# Patient Record
Sex: FEMALE | Race: WHITE | NOT HISPANIC OR LATINO
[De-identification: names, ages, dates, MRNs, and addresses within clinical notes are randomized per-mention and may not be internally consistent; named-entity substitution may affect disease eponyms.]

---

## 2022-03-21 ENCOUNTER — APPOINTMENT (OUTPATIENT)
Dept: ANTEPARTUM | Facility: CLINIC | Age: 32
End: 2022-03-21
Payer: COMMERCIAL

## 2022-03-21 ENCOUNTER — ASOB RESULT (OUTPATIENT)
Age: 32
End: 2022-03-21

## 2022-03-21 ENCOUNTER — TRANSCRIPTION ENCOUNTER (OUTPATIENT)
Age: 32
End: 2022-03-21

## 2022-03-21 PROCEDURE — 76801 OB US < 14 WKS SINGLE FETUS: CPT

## 2022-03-21 PROCEDURE — 76813 OB US NUCHAL MEAS 1 GEST: CPT

## 2022-04-20 ENCOUNTER — ASOB RESULT (OUTPATIENT)
Age: 32
End: 2022-04-20

## 2022-04-20 ENCOUNTER — APPOINTMENT (OUTPATIENT)
Dept: ANTEPARTUM | Facility: CLINIC | Age: 32
End: 2022-04-20
Payer: COMMERCIAL

## 2022-04-20 PROCEDURE — 76805 OB US >/= 14 WKS SNGL FETUS: CPT

## 2022-04-20 PROCEDURE — 76817 TRANSVAGINAL US OBSTETRIC: CPT

## 2022-04-25 PROBLEM — Z00.00 ENCOUNTER FOR PREVENTIVE HEALTH EXAMINATION: Status: ACTIVE | Noted: 2022-04-25

## 2022-05-16 ENCOUNTER — ASOB RESULT (OUTPATIENT)
Age: 32
End: 2022-05-16

## 2022-05-16 ENCOUNTER — APPOINTMENT (OUTPATIENT)
Dept: ANTEPARTUM | Facility: CLINIC | Age: 32
End: 2022-05-16
Payer: COMMERCIAL

## 2022-05-16 PROCEDURE — 76816 OB US FOLLOW-UP PER FETUS: CPT

## 2022-07-11 ENCOUNTER — ASOB RESULT (OUTPATIENT)
Age: 32
End: 2022-07-11

## 2022-07-11 ENCOUNTER — APPOINTMENT (OUTPATIENT)
Dept: ANTEPARTUM | Facility: CLINIC | Age: 32
End: 2022-07-11

## 2022-07-11 PROCEDURE — 76816 OB US FOLLOW-UP PER FETUS: CPT

## 2022-07-11 PROCEDURE — 76819 FETAL BIOPHYS PROFIL W/O NST: CPT

## 2022-07-20 ENCOUNTER — APPOINTMENT (OUTPATIENT)
Dept: MATERNAL FETAL MEDICINE | Facility: CLINIC | Age: 32
End: 2022-07-20

## 2022-07-20 PROCEDURE — 99203 OFFICE O/P NEW LOW 30 MIN: CPT | Mod: 95

## 2022-07-26 ENCOUNTER — ASOB RESULT (OUTPATIENT)
Age: 32
End: 2022-07-26

## 2022-07-26 ENCOUNTER — APPOINTMENT (OUTPATIENT)
Dept: ANTEPARTUM | Facility: CLINIC | Age: 32
End: 2022-07-26

## 2022-07-26 PROCEDURE — 76818 FETAL BIOPHYS PROFILE W/NST: CPT

## 2022-07-26 PROCEDURE — 76816 OB US FOLLOW-UP PER FETUS: CPT

## 2022-08-01 NOTE — HISTORY OF PRESENT ILLNESS
[FreeTextEntry1] : Maternal-Fetal Medicine consultation \par Prenatal care: Dr. Renteria\par \par Chief compliant: Gestational diabetes\par \par JONNA CHEN is a 32 year  at 29w4d (BEKAH [birth year]) that presents for a MFM consultation due to gestational diabetes.\par \par Fastin 80 93 89 88\par B (2 hr): 115 119 94 109 114 \par L: 108 111 96 111 97\par D: 108 104 107 120 110\par \par Medical history notable for anxiety. Surgical history unremarkable. Gynecologic history is unremarkable. Current medications include Zoloft 125 mg day, prenatal vitamins, calcium, stool softener. Allergies: NKDA \par \par She works at [employer]. She lives with her . She denies a history of tobacco use. She denies alcohol or drug use in this pregnancy.  She has never received blood products but is willing to receive them if needed. \par \par Family history is unremarkable. She denies a family history of birth defects, mental retardation, developmental delay or genetic disorders. Denies family history of obstetric complications such as preeclampsia, stillbirth or  delivery. However, reports both she and her sister were born with low birth weights.\par \par Height: 5'2" Prepregnancy weight: 100#  \par Prepregnancy BMI: 18.3

## 2022-08-01 NOTE — DISCUSSION/SUMMARY
[FreeTextEntry1] : Maintaining good glycemic control is the key intervention for reducing the frequency and/or severity of complications related to gestational diabetes mellitus (GDM). We reviewed the risks associated with GDM including macrosomia/LGA infant, preeclampsia, polyhydramnios, stillbirth and  morbidities (such as hypoglycemia, hyperbilirubinemia, electrolyte abnormalities, polycythemia, respiratory distress and cardiomyopathy). Risks associated with GDM beyond the pregnancy and  period were also briefly discussed including development of obesity, impaired glucose tolerance, or metabolic syndrome. Fortunately, with good glucose control in pregnancy all these risks can be dramatically reduced.\par \par GDM is also a strong marker for maternal development of type 2 diabetes. She was counseled that lifestyle interventions, which she is already instituting, such as dietary changes and exercise can reduce her lifetime risk of DM. Exercise in pregnancy was encouraged, and follow-up with the nutritionist as needed.\par \par  \par Recommendations:\par Good control of GDM noted\par Goal glucose ranges reviewed\par Continue MFM f/u

## 2022-08-10 ENCOUNTER — ASOB RESULT (OUTPATIENT)
Age: 32
End: 2022-08-10

## 2022-08-10 ENCOUNTER — APPOINTMENT (OUTPATIENT)
Dept: ANTEPARTUM | Facility: CLINIC | Age: 32
End: 2022-08-10

## 2022-08-10 PROCEDURE — 76816 OB US FOLLOW-UP PER FETUS: CPT

## 2022-08-10 PROCEDURE — 76819 FETAL BIOPHYS PROFIL W/O NST: CPT

## 2022-08-24 ENCOUNTER — ASOB RESULT (OUTPATIENT)
Age: 32
End: 2022-08-24

## 2022-08-24 ENCOUNTER — APPOINTMENT (OUTPATIENT)
Dept: ANTEPARTUM | Facility: CLINIC | Age: 32
End: 2022-08-24

## 2022-08-24 PROCEDURE — 76819 FETAL BIOPHYS PROFIL W/O NST: CPT

## 2022-08-24 PROCEDURE — 76816 OB US FOLLOW-UP PER FETUS: CPT

## 2022-08-27 ENCOUNTER — INPATIENT (INPATIENT)
Facility: HOSPITAL | Age: 32
LOS: 4 days | Discharge: HOME | End: 2022-09-01
Attending: STUDENT IN AN ORGANIZED HEALTH CARE EDUCATION/TRAINING PROGRAM | Admitting: STUDENT IN AN ORGANIZED HEALTH CARE EDUCATION/TRAINING PROGRAM
Payer: COMMERCIAL

## 2022-08-27 ENCOUNTER — TRANSCRIPTION ENCOUNTER (OUTPATIENT)
Age: 32
End: 2022-08-27

## 2022-08-27 ENCOUNTER — RESULT REVIEW (OUTPATIENT)
Age: 32
End: 2022-08-27

## 2022-08-27 VITALS — WEIGHT: 143.96 LBS | HEIGHT: 64 IN

## 2022-08-27 DIAGNOSIS — O26.899 OTHER SPECIFIED PREGNANCY RELATED CONDITIONS, UNSPECIFIED TRIMESTER: ICD-10-CM

## 2022-08-27 DIAGNOSIS — Z3A.00 WEEKS OF GESTATION OF PREGNANCY NOT SPECIFIED: ICD-10-CM

## 2022-08-27 LAB
ALBUMIN SERPL ELPH-MCNC: 3.9 G/DL — SIGNIFICANT CHANGE UP (ref 3.3–5)
ALP SERPL-CCNC: 157 U/L — HIGH (ref 40–120)
ALT FLD-CCNC: 22 U/L — SIGNIFICANT CHANGE UP (ref 10–45)
ANION GAP SERPL CALC-SCNC: 14 MMOL/L — SIGNIFICANT CHANGE UP (ref 5–17)
APPEARANCE UR: CLEAR — SIGNIFICANT CHANGE UP
APTT BLD: 25.5 SEC — LOW (ref 27.5–35.5)
AST SERPL-CCNC: 30 U/L — SIGNIFICANT CHANGE UP (ref 10–40)
BACTERIA # UR AUTO: PRESENT /HPF
BASOPHILS # BLD AUTO: 0.02 K/UL — SIGNIFICANT CHANGE UP (ref 0–0.2)
BASOPHILS NFR BLD AUTO: 0.2 % — SIGNIFICANT CHANGE UP (ref 0–2)
BILIRUB SERPL-MCNC: <0.2 MG/DL — SIGNIFICANT CHANGE UP (ref 0.2–1.2)
BILIRUB UR-MCNC: NEGATIVE — SIGNIFICANT CHANGE UP
BLD GP AB SCN SERPL QL: NEGATIVE — SIGNIFICANT CHANGE UP
BUN SERPL-MCNC: 20 MG/DL — SIGNIFICANT CHANGE UP (ref 7–23)
CALCIUM SERPL-MCNC: 9.5 MG/DL — SIGNIFICANT CHANGE UP (ref 8.4–10.5)
CHLORIDE SERPL-SCNC: 102 MMOL/L — SIGNIFICANT CHANGE UP (ref 96–108)
CO2 SERPL-SCNC: 19 MMOL/L — LOW (ref 22–31)
COLOR SPEC: YELLOW — SIGNIFICANT CHANGE UP
CREAT ?TM UR-MCNC: 45 MG/DL — SIGNIFICANT CHANGE UP
CREAT SERPL-MCNC: 1 MG/DL — SIGNIFICANT CHANGE UP (ref 0.5–1.3)
DIFF PNL FLD: ABNORMAL
EGFR: 77 ML/MIN/1.73M2 — SIGNIFICANT CHANGE UP
EOSINOPHIL # BLD AUTO: 0.09 K/UL — SIGNIFICANT CHANGE UP (ref 0–0.5)
EOSINOPHIL NFR BLD AUTO: 0.8 % — SIGNIFICANT CHANGE UP (ref 0–6)
EPI CELLS # UR: SIGNIFICANT CHANGE UP /HPF (ref 0–5)
FIBRINOGEN PPP-MCNC: 673 MG/DL — HIGH (ref 258–438)
GLUCOSE BLDC GLUCOMTR-MCNC: 105 MG/DL — HIGH (ref 70–99)
GLUCOSE BLDC GLUCOMTR-MCNC: 80 MG/DL — SIGNIFICANT CHANGE UP (ref 70–99)
GLUCOSE SERPL-MCNC: 82 MG/DL — SIGNIFICANT CHANGE UP (ref 70–99)
GLUCOSE UR QL: NEGATIVE — SIGNIFICANT CHANGE UP
GRAN CASTS # UR COMP ASSIST: ABNORMAL /LPF
HCT VFR BLD CALC: 37.7 % — SIGNIFICANT CHANGE UP (ref 34.5–45)
HGB BLD-MCNC: 12.9 G/DL — SIGNIFICANT CHANGE UP (ref 11.5–15.5)
IMM GRANULOCYTES NFR BLD AUTO: 0.2 % — SIGNIFICANT CHANGE UP (ref 0–1.5)
INR BLD: 0.89 — SIGNIFICANT CHANGE UP (ref 0.88–1.16)
KETONES UR-MCNC: NEGATIVE — SIGNIFICANT CHANGE UP
LDH SERPL L TO P-CCNC: 213 U/L — SIGNIFICANT CHANGE UP (ref 50–242)
LEUKOCYTE ESTERASE UR-ACNC: NEGATIVE — SIGNIFICANT CHANGE UP
LYMPHOCYTES # BLD AUTO: 2.6 K/UL — SIGNIFICANT CHANGE UP (ref 1–3.3)
LYMPHOCYTES # BLD AUTO: 24.5 % — SIGNIFICANT CHANGE UP (ref 13–44)
MCHC RBC-ENTMCNC: 28.7 PG — SIGNIFICANT CHANGE UP (ref 27–34)
MCHC RBC-ENTMCNC: 34.2 GM/DL — SIGNIFICANT CHANGE UP (ref 32–36)
MCV RBC AUTO: 84 FL — SIGNIFICANT CHANGE UP (ref 80–100)
MONOCYTES # BLD AUTO: 0.53 K/UL — SIGNIFICANT CHANGE UP (ref 0–0.9)
MONOCYTES NFR BLD AUTO: 5 % — SIGNIFICANT CHANGE UP (ref 2–14)
NEUTROPHILS # BLD AUTO: 7.37 K/UL — SIGNIFICANT CHANGE UP (ref 1.8–7.4)
NEUTROPHILS NFR BLD AUTO: 69.3 % — SIGNIFICANT CHANGE UP (ref 43–77)
NITRITE UR-MCNC: NEGATIVE — SIGNIFICANT CHANGE UP
NRBC # BLD: 0 /100 WBCS — SIGNIFICANT CHANGE UP (ref 0–0)
PH UR: 6 — SIGNIFICANT CHANGE UP (ref 5–8)
PLATELET # BLD AUTO: 200 K/UL — SIGNIFICANT CHANGE UP (ref 150–400)
POTASSIUM SERPL-MCNC: 4.5 MMOL/L — SIGNIFICANT CHANGE UP (ref 3.5–5.3)
POTASSIUM SERPL-SCNC: 4.5 MMOL/L — SIGNIFICANT CHANGE UP (ref 3.5–5.3)
PROT ?TM UR-MCNC: 55 MG/DL — HIGH (ref 0–12)
PROT SERPL-MCNC: 7 G/DL — SIGNIFICANT CHANGE UP (ref 6–8.3)
PROT UR-MCNC: 30 MG/DL
PROT/CREAT UR-RTO: 1.2 RATIO — HIGH (ref 0–0.2)
PROTHROM AB SERPL-ACNC: 10.6 SEC — SIGNIFICANT CHANGE UP (ref 10.5–13.4)
RBC # BLD: 4.49 M/UL — SIGNIFICANT CHANGE UP (ref 3.8–5.2)
RBC # FLD: 12.5 % — SIGNIFICANT CHANGE UP (ref 10.3–14.5)
RBC CASTS # UR COMP ASSIST: > 10 /HPF
RH IG SCN BLD-IMP: POSITIVE — SIGNIFICANT CHANGE UP
RH IG SCN BLD-IMP: POSITIVE — SIGNIFICANT CHANGE UP
SARS-COV-2 RNA SPEC QL NAA+PROBE: SIGNIFICANT CHANGE UP
SODIUM SERPL-SCNC: 135 MMOL/L — SIGNIFICANT CHANGE UP (ref 135–145)
SP GR SPEC: 1.01 — SIGNIFICANT CHANGE UP (ref 1–1.03)
URATE SERPL-MCNC: 6.7 MG/DL — SIGNIFICANT CHANGE UP (ref 2.5–7)
UROBILINOGEN FLD QL: 0.2 E.U./DL — SIGNIFICANT CHANGE UP
WBC # BLD: 10.63 K/UL — HIGH (ref 3.8–10.5)
WBC # FLD AUTO: 10.63 K/UL — HIGH (ref 3.8–10.5)
WBC UR QL: < 5 /HPF — SIGNIFICANT CHANGE UP

## 2022-08-27 RX ORDER — CEFAZOLIN SODIUM 1 G
2000 VIAL (EA) INJECTION ONCE
Refills: 0 | Status: COMPLETED | OUTPATIENT
Start: 2022-08-27 | End: 2022-08-27

## 2022-08-27 RX ORDER — CHLORHEXIDINE GLUCONATE 213 G/1000ML
1 SOLUTION TOPICAL DAILY
Refills: 0 | Status: DISCONTINUED | OUTPATIENT
Start: 2022-08-27 | End: 2022-09-01

## 2022-08-27 RX ORDER — CITRIC ACID/SODIUM CITRATE 300-500 MG
30 SOLUTION, ORAL ORAL ONCE
Refills: 0 | Status: COMPLETED | OUTPATIENT
Start: 2022-08-27 | End: 2022-08-27

## 2022-08-27 RX ORDER — SODIUM CHLORIDE 9 MG/ML
1000 INJECTION, SOLUTION INTRAVENOUS
Refills: 0 | Status: DISCONTINUED | OUTPATIENT
Start: 2022-08-27 | End: 2022-09-01

## 2022-08-27 RX ORDER — AZITHROMYCIN 500 MG/1
500 TABLET, FILM COATED ORAL ONCE
Refills: 0 | Status: DISCONTINUED | OUTPATIENT
Start: 2022-08-27 | End: 2022-09-01

## 2022-08-27 RX ADMIN — Medication 12 MILLIGRAM(S): at 20:00

## 2022-08-27 RX ADMIN — Medication 30 MILLILITER(S): at 23:35

## 2022-08-27 RX ADMIN — SODIUM CHLORIDE 125 MILLILITER(S): 9 INJECTION, SOLUTION INTRAVENOUS at 20:11

## 2022-08-27 RX ADMIN — Medication 100 MILLIGRAM(S): at 23:35

## 2022-08-28 LAB
ALBUMIN SERPL ELPH-MCNC: 3.1 G/DL — LOW (ref 3.3–5)
ALP SERPL-CCNC: 99 U/L — SIGNIFICANT CHANGE UP (ref 40–120)
ALT FLD-CCNC: 20 U/L — SIGNIFICANT CHANGE UP (ref 10–45)
ANION GAP SERPL CALC-SCNC: 12 MMOL/L — SIGNIFICANT CHANGE UP (ref 5–17)
AST SERPL-CCNC: 32 U/L — SIGNIFICANT CHANGE UP (ref 10–40)
BILIRUB SERPL-MCNC: <0.2 MG/DL — SIGNIFICANT CHANGE UP (ref 0.2–1.2)
BUN SERPL-MCNC: 11 MG/DL — SIGNIFICANT CHANGE UP (ref 7–23)
CALCIUM SERPL-MCNC: 8.6 MG/DL — SIGNIFICANT CHANGE UP (ref 8.4–10.5)
CHLORIDE SERPL-SCNC: 100 MMOL/L — SIGNIFICANT CHANGE UP (ref 96–108)
CO2 SERPL-SCNC: 22 MMOL/L — SIGNIFICANT CHANGE UP (ref 22–31)
CREAT ?TM UR-MCNC: 15 MG/DL — SIGNIFICANT CHANGE UP
CREAT SERPL-MCNC: 0.75 MG/DL — SIGNIFICANT CHANGE UP (ref 0.5–1.3)
EGFR: 109 ML/MIN/1.73M2 — SIGNIFICANT CHANGE UP
FIBRINOGEN PPP-MCNC: 459 MG/DL — HIGH (ref 258–438)
GLUCOSE SERPL-MCNC: 243 MG/DL — HIGH (ref 70–99)
HCT VFR BLD CALC: 29.9 % — LOW (ref 34.5–45)
HGB BLD-MCNC: 10 G/DL — LOW (ref 11.5–15.5)
MCHC RBC-ENTMCNC: 28 PG — SIGNIFICANT CHANGE UP (ref 27–34)
MCHC RBC-ENTMCNC: 33.4 GM/DL — SIGNIFICANT CHANGE UP (ref 32–36)
MCV RBC AUTO: 83.8 FL — SIGNIFICANT CHANGE UP (ref 80–100)
NRBC # BLD: 0 /100 WBCS — SIGNIFICANT CHANGE UP (ref 0–0)
PLATELET # BLD AUTO: 133 K/UL — LOW (ref 150–400)
POTASSIUM SERPL-MCNC: 3.6 MMOL/L — SIGNIFICANT CHANGE UP (ref 3.5–5.3)
POTASSIUM SERPL-SCNC: 3.6 MMOL/L — SIGNIFICANT CHANGE UP (ref 3.5–5.3)
PROT ?TM UR-MCNC: <4 MG/DL — SIGNIFICANT CHANGE UP (ref 0–12)
PROT SERPL-MCNC: 5.4 G/DL — LOW (ref 6–8.3)
PROT/CREAT UR-RTO: SIGNIFICANT CHANGE UP (ref 0–0.2)
RBC # BLD: 3.57 M/UL — LOW (ref 3.8–5.2)
RBC # FLD: 12.2 % — SIGNIFICANT CHANGE UP (ref 10.3–14.5)
SODIUM SERPL-SCNC: 134 MMOL/L — LOW (ref 135–145)
WBC # BLD: 11.68 K/UL — HIGH (ref 3.8–10.5)
WBC # FLD AUTO: 11.68 K/UL — HIGH (ref 3.8–10.5)

## 2022-08-28 PROCEDURE — 88307 TISSUE EXAM BY PATHOLOGIST: CPT | Mod: 26

## 2022-08-28 RX ORDER — DIPHENHYDRAMINE HCL 50 MG
25 CAPSULE ORAL EVERY 6 HOURS
Refills: 0 | Status: DISCONTINUED | OUTPATIENT
Start: 2022-08-28 | End: 2022-09-01

## 2022-08-28 RX ORDER — ACETAMINOPHEN 500 MG
975 TABLET ORAL EVERY 6 HOURS
Refills: 0 | Status: DISCONTINUED | OUTPATIENT
Start: 2022-08-28 | End: 2022-09-01

## 2022-08-28 RX ORDER — DIPHENHYDRAMINE HCL 50 MG
25 CAPSULE ORAL EVERY 6 HOURS
Refills: 0 | Status: COMPLETED | OUTPATIENT
Start: 2022-08-28 | End: 2023-07-27

## 2022-08-28 RX ORDER — IBUPROFEN 200 MG
600 TABLET ORAL EVERY 6 HOURS
Refills: 0 | Status: DISCONTINUED | OUTPATIENT
Start: 2022-08-28 | End: 2022-09-01

## 2022-08-28 RX ORDER — OXYCODONE HYDROCHLORIDE 5 MG/1
5 TABLET ORAL
Refills: 0 | Status: COMPLETED | OUTPATIENT
Start: 2022-08-28 | End: 2022-09-04

## 2022-08-28 RX ORDER — SODIUM CHLORIDE 9 MG/ML
1000 INJECTION, SOLUTION INTRAVENOUS
Refills: 0 | Status: DISCONTINUED | OUTPATIENT
Start: 2022-08-28 | End: 2022-09-01

## 2022-08-28 RX ORDER — TETANUS TOXOID, REDUCED DIPHTHERIA TOXOID AND ACELLULAR PERTUSSIS VACCINE, ADSORBED 5; 2.5; 8; 8; 2.5 [IU]/.5ML; [IU]/.5ML; UG/.5ML; UG/.5ML; UG/.5ML
0.5 SUSPENSION INTRAMUSCULAR ONCE
Refills: 0 | Status: DISCONTINUED | OUTPATIENT
Start: 2022-08-28 | End: 2022-09-01

## 2022-08-28 RX ORDER — MAGNESIUM HYDROXIDE 400 MG/1
30 TABLET, CHEWABLE ORAL
Refills: 0 | Status: DISCONTINUED | OUTPATIENT
Start: 2022-08-28 | End: 2022-09-01

## 2022-08-28 RX ORDER — OXYCODONE HYDROCHLORIDE 5 MG/1
5 TABLET ORAL
Refills: 0 | Status: DISCONTINUED | OUTPATIENT
Start: 2022-08-28 | End: 2022-09-01

## 2022-08-28 RX ORDER — SERTRALINE 25 MG/1
50 TABLET, FILM COATED ORAL DAILY
Refills: 0 | Status: DISCONTINUED | OUTPATIENT
Start: 2022-08-28 | End: 2022-08-28

## 2022-08-28 RX ORDER — LANOLIN
1 OINTMENT (GRAM) TOPICAL EVERY 6 HOURS
Refills: 0 | Status: DISCONTINUED | OUTPATIENT
Start: 2022-08-28 | End: 2022-09-01

## 2022-08-28 RX ORDER — ACETAMINOPHEN 500 MG
975 TABLET ORAL EVERY 6 HOURS
Refills: 0 | Status: COMPLETED | OUTPATIENT
Start: 2022-08-28 | End: 2023-07-27

## 2022-08-28 RX ORDER — CEFAZOLIN SODIUM 1 G
2000 VIAL (EA) INJECTION ONCE
Refills: 0 | Status: COMPLETED | OUTPATIENT
Start: 2022-08-28 | End: 2022-08-28

## 2022-08-28 RX ORDER — SIMETHICONE 80 MG/1
80 TABLET, CHEWABLE ORAL EVERY 4 HOURS
Refills: 0 | Status: DISCONTINUED | OUTPATIENT
Start: 2022-08-28 | End: 2022-09-01

## 2022-08-28 RX ORDER — OXYTOCIN 10 UNIT/ML
333.33 VIAL (ML) INJECTION
Qty: 20 | Refills: 0 | Status: DISCONTINUED | OUTPATIENT
Start: 2022-08-28 | End: 2022-09-01

## 2022-08-28 RX ORDER — SERTRALINE 25 MG/1
150 TABLET, FILM COATED ORAL DAILY
Refills: 0 | Status: DISCONTINUED | OUTPATIENT
Start: 2022-08-28 | End: 2022-09-01

## 2022-08-28 RX ORDER — ENOXAPARIN SODIUM 100 MG/ML
40 INJECTION SUBCUTANEOUS EVERY 24 HOURS
Refills: 0 | Status: DISCONTINUED | OUTPATIENT
Start: 2022-08-28 | End: 2022-09-01

## 2022-08-28 RX ORDER — SERTRALINE 25 MG/1
100 TABLET, FILM COATED ORAL DAILY
Refills: 0 | Status: DISCONTINUED | OUTPATIENT
Start: 2022-08-28 | End: 2022-08-28

## 2022-08-28 RX ORDER — POLYETHYLENE GLYCOL 3350 17 G/17G
17 POWDER, FOR SOLUTION ORAL EVERY 12 HOURS
Refills: 0 | Status: DISCONTINUED | OUTPATIENT
Start: 2022-08-28 | End: 2022-09-01

## 2022-08-28 RX ORDER — ACETAMINOPHEN 500 MG
1000 TABLET ORAL EVERY 6 HOURS
Refills: 0 | Status: COMPLETED | OUTPATIENT
Start: 2022-08-28 | End: 2022-08-29

## 2022-08-28 RX ORDER — OXYCODONE HYDROCHLORIDE 5 MG/1
5 TABLET ORAL ONCE
Refills: 0 | Status: DISCONTINUED | OUTPATIENT
Start: 2022-08-28 | End: 2022-09-01

## 2022-08-28 RX ORDER — OXYCODONE HYDROCHLORIDE 5 MG/1
5 TABLET ORAL ONCE
Refills: 0 | Status: DISCONTINUED | OUTPATIENT
Start: 2022-08-28 | End: 2022-08-28

## 2022-08-28 RX ORDER — BUPIVACAINE 13.3 MG/ML
30 INJECTION, SUSPENSION, LIPOSOMAL INFILTRATION ONCE
Refills: 0 | Status: COMPLETED | OUTPATIENT
Start: 2022-08-28 | End: 2022-08-28

## 2022-08-28 RX ADMIN — SERTRALINE 100 MILLIGRAM(S): 25 TABLET, FILM COATED ORAL at 11:50

## 2022-08-28 RX ADMIN — Medication 25 MILLIGRAM(S): at 23:06

## 2022-08-28 RX ADMIN — Medication 25 MILLIGRAM(S): at 07:51

## 2022-08-28 RX ADMIN — Medication 975 MILLIGRAM(S): at 11:50

## 2022-08-28 RX ADMIN — Medication 400 MILLIGRAM(S): at 06:07

## 2022-08-28 RX ADMIN — POLYETHYLENE GLYCOL 3350 17 GRAM(S): 17 POWDER, FOR SOLUTION ORAL at 07:55

## 2022-08-28 RX ADMIN — SIMETHICONE 80 MILLIGRAM(S): 80 TABLET, CHEWABLE ORAL at 15:01

## 2022-08-28 RX ADMIN — SERTRALINE 150 MILLIGRAM(S): 25 TABLET, FILM COATED ORAL at 13:17

## 2022-08-28 RX ADMIN — Medication 975 MILLIGRAM(S): at 19:14

## 2022-08-28 RX ADMIN — Medication 600 MILLIGRAM(S): at 21:45

## 2022-08-28 RX ADMIN — Medication 100 MILLIGRAM(S): at 04:13

## 2022-08-28 RX ADMIN — OXYCODONE HYDROCHLORIDE 5 MILLIGRAM(S): 5 TABLET ORAL at 04:24

## 2022-08-28 RX ADMIN — Medication 600 MILLIGRAM(S): at 16:00

## 2022-08-28 RX ADMIN — Medication 600 MILLIGRAM(S): at 21:08

## 2022-08-28 RX ADMIN — BUPIVACAINE 30 MILLILITER(S): 13.3 INJECTION, SUSPENSION, LIPOSOMAL INFILTRATION at 01:30

## 2022-08-28 RX ADMIN — ENOXAPARIN SODIUM 40 MILLIGRAM(S): 100 INJECTION SUBCUTANEOUS at 21:08

## 2022-08-28 RX ADMIN — Medication 975 MILLIGRAM(S): at 12:40

## 2022-08-28 RX ADMIN — Medication 1000 MILLIGRAM(S): at 06:20

## 2022-08-28 RX ADMIN — Medication 975 MILLIGRAM(S): at 18:13

## 2022-08-28 RX ADMIN — Medication 600 MILLIGRAM(S): at 15:01

## 2022-08-28 RX ADMIN — POLYETHYLENE GLYCOL 3350 17 GRAM(S): 17 POWDER, FOR SOLUTION ORAL at 21:15

## 2022-08-28 NOTE — LACTATION INITIAL EVALUATION - NS LACT CON REASON FOR REQ
35 wk baby girl in NICU. Met the mother in pp room, provided support and reassurance to the parents. Complete breastfeeding education, incl. milk production feedback system, guidelines, and normal pre-term behavior were explained. Approximate timeline to direct breastfeed attempts were discussed. Set mother up on breast pump and pumping instructions, protocol, and kit care were taught. Manual expression also taught with proper return demo, and encouraged to be performed in addition to pumping to help support supply and aid in colostral collection. 2ml collected thus far abd mother cotinuing and father will transport to nicu. Encouraged frequent visitation and skin to skin contact with baby. Mother understands she may call for my assistance or questions as needed. Referral for lactation telehealth was placed for further support s/p d/c./primaparous mom/NICU admission

## 2022-08-29 LAB
BASOPHILS # BLD AUTO: 0.02 K/UL — SIGNIFICANT CHANGE UP (ref 0–0.2)
BASOPHILS NFR BLD AUTO: 0.2 % — SIGNIFICANT CHANGE UP (ref 0–2)
CULTURE RESULTS: SIGNIFICANT CHANGE UP
EOSINOPHIL # BLD AUTO: 0.02 K/UL — SIGNIFICANT CHANGE UP (ref 0–0.5)
EOSINOPHIL NFR BLD AUTO: 0.2 % — SIGNIFICANT CHANGE UP (ref 0–6)
HCT VFR BLD CALC: 29.2 % — LOW (ref 34.5–45)
HGB BLD-MCNC: 9.8 G/DL — LOW (ref 11.5–15.5)
IMM GRANULOCYTES NFR BLD AUTO: 0.4 % — SIGNIFICANT CHANGE UP (ref 0–1.5)
LYMPHOCYTES # BLD AUTO: 2.33 K/UL — SIGNIFICANT CHANGE UP (ref 1–3.3)
LYMPHOCYTES # BLD AUTO: 24.3 % — SIGNIFICANT CHANGE UP (ref 13–44)
MCHC RBC-ENTMCNC: 28.7 PG — SIGNIFICANT CHANGE UP (ref 27–34)
MCHC RBC-ENTMCNC: 33.6 GM/DL — SIGNIFICANT CHANGE UP (ref 32–36)
MCV RBC AUTO: 85.6 FL — SIGNIFICANT CHANGE UP (ref 80–100)
MONOCYTES # BLD AUTO: 0.6 K/UL — SIGNIFICANT CHANGE UP (ref 0–0.9)
MONOCYTES NFR BLD AUTO: 6.3 % — SIGNIFICANT CHANGE UP (ref 2–14)
NEUTROPHILS # BLD AUTO: 6.59 K/UL — SIGNIFICANT CHANGE UP (ref 1.8–7.4)
NEUTROPHILS NFR BLD AUTO: 68.6 % — SIGNIFICANT CHANGE UP (ref 43–77)
NRBC # BLD: 0 /100 WBCS — SIGNIFICANT CHANGE UP (ref 0–0)
PLATELET # BLD AUTO: 139 K/UL — LOW (ref 150–400)
RBC # BLD: 3.41 M/UL — LOW (ref 3.8–5.2)
RBC # FLD: 12.9 % — SIGNIFICANT CHANGE UP (ref 10.3–14.5)
SPECIMEN SOURCE: SIGNIFICANT CHANGE UP
WBC # BLD: 9.6 K/UL — SIGNIFICANT CHANGE UP (ref 3.8–10.5)
WBC # FLD AUTO: 9.6 K/UL — SIGNIFICANT CHANGE UP (ref 3.8–10.5)

## 2022-08-29 RX ADMIN — OXYCODONE HYDROCHLORIDE 5 MILLIGRAM(S): 5 TABLET ORAL at 13:50

## 2022-08-29 RX ADMIN — OXYCODONE HYDROCHLORIDE 5 MILLIGRAM(S): 5 TABLET ORAL at 07:39

## 2022-08-29 RX ADMIN — Medication 975 MILLIGRAM(S): at 06:12

## 2022-08-29 RX ADMIN — SERTRALINE 150 MILLIGRAM(S): 25 TABLET, FILM COATED ORAL at 12:00

## 2022-08-29 RX ADMIN — Medication 975 MILLIGRAM(S): at 12:55

## 2022-08-29 RX ADMIN — POLYETHYLENE GLYCOL 3350 17 GRAM(S): 17 POWDER, FOR SOLUTION ORAL at 21:40

## 2022-08-29 RX ADMIN — Medication 975 MILLIGRAM(S): at 18:23

## 2022-08-29 RX ADMIN — POLYETHYLENE GLYCOL 3350 17 GRAM(S): 17 POWDER, FOR SOLUTION ORAL at 09:03

## 2022-08-29 RX ADMIN — Medication 975 MILLIGRAM(S): at 07:50

## 2022-08-29 RX ADMIN — Medication 975 MILLIGRAM(S): at 19:15

## 2022-08-29 RX ADMIN — Medication 600 MILLIGRAM(S): at 03:04

## 2022-08-29 RX ADMIN — ENOXAPARIN SODIUM 40 MILLIGRAM(S): 100 INJECTION SUBCUTANEOUS at 21:40

## 2022-08-29 RX ADMIN — Medication 600 MILLIGRAM(S): at 22:15

## 2022-08-29 RX ADMIN — Medication 600 MILLIGRAM(S): at 10:00

## 2022-08-29 RX ADMIN — OXYCODONE HYDROCHLORIDE 5 MILLIGRAM(S): 5 TABLET ORAL at 08:30

## 2022-08-29 RX ADMIN — Medication 975 MILLIGRAM(S): at 01:00

## 2022-08-29 RX ADMIN — Medication 975 MILLIGRAM(S): at 12:00

## 2022-08-29 RX ADMIN — SIMETHICONE 80 MILLIGRAM(S): 80 TABLET, CHEWABLE ORAL at 18:29

## 2022-08-29 RX ADMIN — Medication 600 MILLIGRAM(S): at 03:37

## 2022-08-29 RX ADMIN — Medication 600 MILLIGRAM(S): at 09:02

## 2022-08-29 RX ADMIN — OXYCODONE HYDROCHLORIDE 5 MILLIGRAM(S): 5 TABLET ORAL at 18:20

## 2022-08-29 RX ADMIN — OXYCODONE HYDROCHLORIDE 5 MILLIGRAM(S): 5 TABLET ORAL at 17:21

## 2022-08-29 RX ADMIN — Medication 975 MILLIGRAM(S): at 00:26

## 2022-08-29 RX ADMIN — SIMETHICONE 80 MILLIGRAM(S): 80 TABLET, CHEWABLE ORAL at 22:02

## 2022-08-29 RX ADMIN — Medication 600 MILLIGRAM(S): at 21:40

## 2022-08-29 RX ADMIN — OXYCODONE HYDROCHLORIDE 5 MILLIGRAM(S): 5 TABLET ORAL at 12:56

## 2022-08-29 NOTE — PROGRESS NOTE ADULT - SUBJECTIVE AND OBJECTIVE BOX
Patient seen and evaluated at bedside. Pt states she has mild abdominal pain, overall controlled with pain medication. She is tolerating reg diet, passing flatus, and voiding. Pt is breastfeeding.  She denies headache, vision changes/scotoma, dizziness, chest pain, palpitations, shortness of breath, nausea, vomiting, or heavy vaginal bleeding.    Physical Exam:  Vital Signs Last 24 Hrs  T(C): 36.7 (29 Aug 2022 06:03), Max: 37.2 (28 Aug 2022 15:00)  T(F): 98.1 (29 Aug 2022 06:03), Max: 98.9 (28 Aug 2022 15:00)  HR: 61 (29 Aug 2022 06:03) (53 - 61)  BP: 120/73 (29 Aug 2022 06:03) (103/64 - 122/71)  RR: 18 (29 Aug 2022 06:03) (16 - 18)  SpO2: 99% (29 Aug 2022 06:03) (93% - 99%)    Parameters below as of 29 Aug 2022 06:03  Patient On (Oxygen Delivery Method): room air        GA: comfortable in NAD  Abd: soft, nontender, nondistended, no rebound or guarding. Abdominal dressing removed, incision clean, dry and intact w/ steri strips, uterus firm at midline  : lochia WNL  Extremities: no swelling or calf tenderness                            9.8    9.60  )-----------( 139      ( 29 Aug 2022 05:31 )             29.2     08-28    134<L>  |  100  |  11  ----------------------------<  243<H>  3.6   |  22  |  0.75    Ca    8.6      28 Aug 2022 11:05    TPro  5.4<L>  /  Alb  3.1<L>  /  TBili  <0.2  /  DBili  x   /  AST  32  /  ALT  20  /  AlkPhos  99  08-28      PT/INR - ( 27 Aug 2022 19:28 )   PT: 10.6 sec;   INR: 0.89          PTT - ( 27 Aug 2022 19:28 )  PTT:25.5 sec  acetaminophen     Tablet .. 975 milliGRAM(s) Oral every 6 hours  azithromycin  IVPB 500 milliGRAM(s) IV Intermittent once  chlorhexidine 2% Cloths 1 Application(s) Topical daily  diphenhydrAMINE 25 milliGRAM(s) Oral every 6 hours PRN  diphtheria/tetanus/pertussis (acellular) Vaccine (ADAcel) 0.5 milliLiter(s) IntraMuscular once  enoxaparin Injectable 40 milliGRAM(s) SubCutaneous every 24 hours  ibuprofen  Tablet. 600 milliGRAM(s) Oral every 6 hours  lactated ringers. 1000 milliLiter(s) IV Continuous <Continuous>  lactated ringers. 1000 milliLiter(s) IV Continuous <Continuous>  lanolin Ointment 1 Application(s) Topical every 6 hours PRN  magnesium hydroxide Suspension 30 milliLiter(s) Oral two times a day PRN  oxyCODONE    IR 5 milliGRAM(s) Oral every 3 hours PRN  oxyCODONE    IR 5 milliGRAM(s) Oral once PRN  oxytocin Infusion 333.333 milliUNIT(s)/Min IV Continuous <Continuous>  polyethylene glycol 3350 17 Gram(s) Oral every 12 hours  sertraline 150 milliGRAM(s) Oral daily  simethicone 80 milliGRAM(s) Chew every 4 hours PRN

## 2022-08-29 NOTE — PROGRESS NOTE ADULT - ASSESSMENT
A/P: 32y POD1 s/p  section. Pt is hemodynamically stable.   -  Neuro-Pain control with motrin/acetaminophen, oxycodone for severe pain PRN  -  Cardio: no issues. Continue to monitor routinely   -  GI: Reg diet, Reglan/Zofran prn   -  : voiding  -  DVT prophylaxis: Lovenox 40mg qd, SCDs  -  Activity- ambulatd as tolerated   -  Heme: post-op hemoglobin 9.8 A/P: 32y POD1 s/p primary  section for PPROM and breech presentation. Pt is hemodynamically stable.   -  Neuro-Pain control with motrin/acetaminophen, oxycodone for severe pain PRN  -  Cardio: no issues. Continue to monitor routinely   -  GI: Reg diet, Reglan/Zofran prn   -  : voiding  -  DVT prophylaxis: Lovenox 40mg qd, SCDs  -  Activity- ambulatd as tolerated   -  Heme: post-op hemoglobin 9.8

## 2022-08-30 RX ADMIN — ENOXAPARIN SODIUM 40 MILLIGRAM(S): 100 INJECTION SUBCUTANEOUS at 21:29

## 2022-08-30 RX ADMIN — Medication 975 MILLIGRAM(S): at 07:00

## 2022-08-30 RX ADMIN — Medication 975 MILLIGRAM(S): at 20:27

## 2022-08-30 RX ADMIN — Medication 600 MILLIGRAM(S): at 18:45

## 2022-08-30 RX ADMIN — Medication 600 MILLIGRAM(S): at 23:41

## 2022-08-30 RX ADMIN — Medication 975 MILLIGRAM(S): at 06:25

## 2022-08-30 RX ADMIN — POLYETHYLENE GLYCOL 3350 17 GRAM(S): 17 POWDER, FOR SOLUTION ORAL at 12:16

## 2022-08-30 RX ADMIN — SIMETHICONE 80 MILLIGRAM(S): 80 TABLET, CHEWABLE ORAL at 06:20

## 2022-08-30 RX ADMIN — Medication 975 MILLIGRAM(S): at 00:12

## 2022-08-30 RX ADMIN — Medication 975 MILLIGRAM(S): at 21:27

## 2022-08-30 RX ADMIN — SIMETHICONE 80 MILLIGRAM(S): 80 TABLET, CHEWABLE ORAL at 12:16

## 2022-08-30 RX ADMIN — Medication 600 MILLIGRAM(S): at 13:00

## 2022-08-30 RX ADMIN — SERTRALINE 150 MILLIGRAM(S): 25 TABLET, FILM COATED ORAL at 12:13

## 2022-08-30 RX ADMIN — Medication 600 MILLIGRAM(S): at 18:19

## 2022-08-30 RX ADMIN — Medication 600 MILLIGRAM(S): at 12:13

## 2022-08-30 RX ADMIN — Medication 975 MILLIGRAM(S): at 00:45

## 2022-08-30 NOTE — PROGRESS NOTE ADULT - PROVIDER SPECIALTY LIST ADULT
Coumadin dosing works better in the evening, at least 2 hours after your supper meal.    Your daily dose of coumadin is as follows.       Monday 5 MG   Tuesday 4 MG   Wednesday 5 MG   Thursday 4 MG   Friday 5 MG   Saturday 5 MG   Sunday 5 MG OB

## 2022-08-30 NOTE — PROGRESS NOTE ADULT - SUBJECTIVE AND OBJECTIVE BOX
Patient seen and evaluated at bedside. Pt states she has minimal abdominal pain, overall controlled with pain medication. She is tolerating reg diet, passing flatus, and voiding. Pt is breastfeeding, baby doing well in NICU. Pt states that she was concerned about postpartum depression/anxiety but she states her mood is great and she is recovery better than expected.  She denies headache, dizziness, chest pain, palpitations, shortness of breath, nausea, vomiting, or heavy vaginal bleeding.    Physical Exam:  Vital Signs Last 24 Hrs  T(C): 37.2 (30 Aug 2022 18:03), Max: 37.2 (30 Aug 2022 14:00)  T(F): 98.9 (30 Aug 2022 18:03), Max: 98.9 (30 Aug 2022 14:00)  HR: 66 (30 Aug 2022 18:03) (62 - 76)  BP: 129/72 (30 Aug 2022 18:03) (116/72 - 129/72)  RR: 18 (30 Aug 2022 18:03) (18 - 19)  SpO2: 97% (30 Aug 2022 18:03) (95% - 98%)        GA: comfortable in NAD  Abd: soft, nontender, nondistended, no rebound or guarding, incision clean, dry and intact, uterus firm at midline  : lochia WNL  Extremities: no swelling or calf tenderness                            9.8    9.60  )-----------( 139      ( 29 Aug 2022 05:31 )             29.2               acetaminophen     Tablet .. 975 milliGRAM(s) Oral every 6 hours  azithromycin  IVPB 500 milliGRAM(s) IV Intermittent once  chlorhexidine 2% Cloths 1 Application(s) Topical daily  diphenhydrAMINE 25 milliGRAM(s) Oral every 6 hours PRN  diphtheria/tetanus/pertussis (acellular) Vaccine (ADAcel) 0.5 milliLiter(s) IntraMuscular once  enoxaparin Injectable 40 milliGRAM(s) SubCutaneous every 24 hours  ibuprofen  Tablet. 600 milliGRAM(s) Oral every 6 hours  lactated ringers. 1000 milliLiter(s) IV Continuous <Continuous>  lactated ringers. 1000 milliLiter(s) IV Continuous <Continuous>  lanolin Ointment 1 Application(s) Topical every 6 hours PRN  magnesium hydroxide Suspension 30 milliLiter(s) Oral two times a day PRN  oxyCODONE    IR 5 milliGRAM(s) Oral every 3 hours PRN  oxyCODONE    IR 5 milliGRAM(s) Oral once PRN  oxytocin Infusion 333.333 milliUNIT(s)/Min IV Continuous <Continuous>  polyethylene glycol 3350 17 Gram(s) Oral every 12 hours  sertraline 150 milliGRAM(s) Oral daily  simethicone 80 milliGRAM(s) Chew every 4 hours PRN

## 2022-08-30 NOTE — PROGRESS NOTE ADULT - ASSESSMENT
A/P: 32y POD2 s/p primary  section for PPROM and breech presentation. Pt is hemodynamically stable.   -  Neuro-Pain control with motrin/acetaminophen, oxycodone for severe pain PRN  -  Cardio: no issues. Continue to monitor routinely   -  GI: Reg diet, Reglan/Zofran prn   -  : voiding  -  DVT prophylaxis: Lovenox 40mg qd, SCDs  -  Activity- ambulate as tolerated

## 2022-08-31 ENCOUNTER — TRANSCRIPTION ENCOUNTER (OUTPATIENT)
Age: 32
End: 2022-08-31

## 2022-08-31 LAB — SURGICAL PATHOLOGY STUDY: SIGNIFICANT CHANGE UP

## 2022-08-31 RX ORDER — IBUPROFEN 200 MG
1 TABLET ORAL
Qty: 0 | Refills: 0 | DISCHARGE
Start: 2022-08-31

## 2022-08-31 RX ORDER — ACETAMINOPHEN 500 MG
3 TABLET ORAL
Qty: 0 | Refills: 0 | DISCHARGE
Start: 2022-08-31

## 2022-08-31 RX ADMIN — Medication 600 MILLIGRAM(S): at 21:12

## 2022-08-31 RX ADMIN — Medication 975 MILLIGRAM(S): at 10:30

## 2022-08-31 RX ADMIN — Medication 600 MILLIGRAM(S): at 06:27

## 2022-08-31 RX ADMIN — SIMETHICONE 80 MILLIGRAM(S): 80 TABLET, CHEWABLE ORAL at 23:32

## 2022-08-31 RX ADMIN — SERTRALINE 150 MILLIGRAM(S): 25 TABLET, FILM COATED ORAL at 12:27

## 2022-08-31 RX ADMIN — Medication 975 MILLIGRAM(S): at 09:36

## 2022-08-31 RX ADMIN — Medication 975 MILLIGRAM(S): at 19:00

## 2022-08-31 RX ADMIN — SIMETHICONE 80 MILLIGRAM(S): 80 TABLET, CHEWABLE ORAL at 18:36

## 2022-08-31 RX ADMIN — Medication 600 MILLIGRAM(S): at 13:10

## 2022-08-31 RX ADMIN — Medication 600 MILLIGRAM(S): at 12:26

## 2022-08-31 RX ADMIN — Medication 600 MILLIGRAM(S): at 22:00

## 2022-08-31 RX ADMIN — ENOXAPARIN SODIUM 40 MILLIGRAM(S): 100 INJECTION SUBCUTANEOUS at 21:12

## 2022-08-31 RX ADMIN — Medication 600 MILLIGRAM(S): at 00:41

## 2022-08-31 RX ADMIN — SIMETHICONE 80 MILLIGRAM(S): 80 TABLET, CHEWABLE ORAL at 09:36

## 2022-08-31 RX ADMIN — Medication 975 MILLIGRAM(S): at 23:28

## 2022-08-31 RX ADMIN — POLYETHYLENE GLYCOL 3350 17 GRAM(S): 17 POWDER, FOR SOLUTION ORAL at 12:26

## 2022-08-31 RX ADMIN — Medication 975 MILLIGRAM(S): at 18:20

## 2022-08-31 RX ADMIN — Medication 600 MILLIGRAM(S): at 05:27

## 2022-08-31 NOTE — CHART NOTE - NSCHARTNOTEFT_GEN_A_CORE
Attempted to see pt at bedside for AM rounds. Pt in NICU. Will round later this afternoon Attempted to see pt at bedside for AM rounds. Pt in NICU. Will round later this afternoon          OB Attending Addendum  Patient seen and examined NICU. She is feeling well, passing flatus. Voiding spontaneously. Ambulated to the NICU to see baby.   Denies headaches, vision changes, RUQ pain, epigastric pain. Breastpumping      VSS  Gen: NAD, sitting in chair  Abd: soft, nontender, nondistended, steristrips in place clean/dry/intact  Ext: trace edema bilaterally    A/P: POD3 s/p 1CS for PPROM/Breech presentation healing well and clinically stable  - Continue routine postop care  - Lovenox DVT PPx  - Discharge home tomorrow as infant in NICU

## 2022-08-31 NOTE — DISCHARGE NOTE OB - CARE PROVIDER_API CALL
Dolores Sanchez (DO)  Obstetrics and Gynecology  [address] [address]  Phone: [phone]  Fax: [phone]  Follow Up Time:    Dolores Sanchez (DO)  Obstetrics and Gynecology  [address] [address]  Phone: [phone]  Fax: [phone]  Follow Up Time: 1 week

## 2022-08-31 NOTE — DISCHARGE NOTE OB - PATIENT PORTAL LINK FT
You can access the FollowMyHealth Patient Portal offered by [facility] by registering at the following website: [email or web address]. By joining [facility] FollowMyHealth portal, you will also be able to view your health information using other applications (apps) compatible with our system.

## 2022-08-31 NOTE — DISCHARGE NOTE OB - HOSPITAL COURSE
Patient underwent an uncomplicated  delivery for PPROM with breech presentation. Uncomplicated postpartum course. On day of discharge, patient is meeting all post op milestones and all vitals are stable.  Admitted for vaginal bleeding and r/o placental abruption.  PPROM the occurred, and, given breech presentation, patient delivered via  section.  Uncomplicated postpartum course. On day of discharge, patient is meeting all post op milestones and all vitals are stable.  Acute blood loss anemia noted on post-operative CBC.  Patient stable with normal vital signs.  No intervention necessary.

## 2022-08-31 NOTE — DISCHARGE NOTE OB - NS MD DC FALL RISK RISK
For information on Fall & Injury Prevention, visit: [email or web address] OR  [email or web address] OR  https://www.cdc.gov/steadi/patient.html

## 2022-08-31 NOTE — DISCHARGE NOTE OB - CARE PLAN
1 Principal Discharge DX:	 delivery delivered  Assessment and plan of treatment:	 delivery, meeting all postpartum milestones.  Please follow-up with your OB doctor within 2 weeks.  You can resume a regular diet at home and may continue your prenatal vitamins as directed.  Please place nothing in the vagina for 6 weeks (no tampons, sex, douching, tub baths, swimming pools, etc).  If you have severe headaches and/or vision changes, heavy bleeding, or chest pain, please call your provider or go to the nearest Emergency Department.  Please call your OB with any signs of symptoms of infection including fever > 100.4 degrees, severe pain, redness/warmth/drainage from the incision,  malodorous vaginal discharge or heavy bleeding requiring more than 1-2 pads/hour. Please refrain from heavy lifting. Avoid soaking/bathing, you may shower normally. Make sure your incision stays dry after shower, avoid creams and ointments.  You can take Motrin 600mg orally every 6 hours  and tyelnol 650mg every 6-8 hours for pain as needed. Take oxycodone 5mg every 6 hours for severe pain as needed.

## 2022-08-31 NOTE — DISCHARGE NOTE OB - PLAN OF CARE
delivery, meeting all postpartum milestones.  Please follow-up with your OB doctor within 2 weeks.  You can resume a regular diet at home and may continue your prenatal vitamins as directed.  Please place nothing in the vagina for 6 weeks (no tampons, sex, douching, tub baths, swimming pools, etc).  If you have severe headaches and/or vision changes, heavy bleeding, or chest pain, please call your provider or go to the nearest Emergency Department.  Please call your OB with any signs of symptoms of infection including fever > 100.4 degrees, severe pain, redness/warmth/drainage from the incision,  malodorous vaginal discharge or heavy bleeding requiring more than 1-2 pads/hour. Please refrain from heavy lifting. Avoid soaking/bathing, you may shower normally. Make sure your incision stays dry after shower, avoid creams and ointments.  You can take Motrin 600mg orally every 6 hours  and tyelnol 650mg every 6-8 hours for pain as needed. Take oxycodone 5mg every 6 hours for severe pain as needed.

## 2022-09-01 VITALS
RESPIRATION RATE: 17 BRPM | OXYGEN SATURATION: 96 % | TEMPERATURE: 98 F | DIASTOLIC BLOOD PRESSURE: 73 MMHG | HEART RATE: 72 BPM | SYSTOLIC BLOOD PRESSURE: 110 MMHG

## 2022-09-01 PROCEDURE — 81001 URINALYSIS AUTO W/SCOPE: CPT

## 2022-09-01 PROCEDURE — 85610 PROTHROMBIN TIME: CPT

## 2022-09-01 PROCEDURE — 88307 TISSUE EXAM BY PATHOLOGIST: CPT

## 2022-09-01 PROCEDURE — 82570 ASSAY OF URINE CREATININE: CPT

## 2022-09-01 PROCEDURE — 99214 OFFICE O/P EST MOD 30 MIN: CPT

## 2022-09-01 PROCEDURE — 87081 CULTURE SCREEN ONLY: CPT

## 2022-09-01 PROCEDURE — 84550 ASSAY OF BLOOD/URIC ACID: CPT

## 2022-09-01 PROCEDURE — 84156 ASSAY OF PROTEIN URINE: CPT

## 2022-09-01 PROCEDURE — 80053 COMPREHEN METABOLIC PANEL: CPT

## 2022-09-01 PROCEDURE — 85025 COMPLETE CBC W/AUTO DIFF WBC: CPT

## 2022-09-01 PROCEDURE — 36415 COLL VENOUS BLD VENIPUNCTURE: CPT

## 2022-09-01 PROCEDURE — 86850 RBC ANTIBODY SCREEN: CPT

## 2022-09-01 PROCEDURE — 82962 GLUCOSE BLOOD TEST: CPT

## 2022-09-01 PROCEDURE — 85730 THROMBOPLASTIN TIME PARTIAL: CPT

## 2022-09-01 PROCEDURE — 83615 LACTATE (LD) (LDH) ENZYME: CPT

## 2022-09-01 PROCEDURE — 86900 BLOOD TYPING SEROLOGIC ABO: CPT

## 2022-09-01 PROCEDURE — 85027 COMPLETE CBC AUTOMATED: CPT

## 2022-09-01 PROCEDURE — 87635 SARS-COV-2 COVID-19 AMP PRB: CPT

## 2022-09-01 PROCEDURE — 86901 BLOOD TYPING SEROLOGIC RH(D): CPT

## 2022-09-01 PROCEDURE — 85384 FIBRINOGEN ACTIVITY: CPT

## 2022-09-01 RX ADMIN — Medication 975 MILLIGRAM(S): at 00:20

## 2022-09-01 RX ADMIN — Medication 975 MILLIGRAM(S): at 06:13

## 2022-09-01 RX ADMIN — Medication 975 MILLIGRAM(S): at 05:31

## 2022-09-01 RX ADMIN — Medication 600 MILLIGRAM(S): at 10:44

## 2022-09-01 RX ADMIN — Medication 600 MILLIGRAM(S): at 11:10

## 2022-09-01 NOTE — PROGRESS NOTE ADULT - ATTENDING COMMENTS
Mrs. Coronado is POD2 s/p primary  section for breech presentation and PROM/vaginal bleeding. Patient also developed pre-eclampsia without severe features as manifested by elevated TP:Cr ratio and mild range pressures on admission.   Postoperatively, patient continues to be clinically well, and  H/H appropriate for EBL. Blood pressures have been normotensive on delivery. No symptoms of severe features.   - Continue routine postop care   - Infant in NICU, plan to keep patient until POD4 .
Mrs. Coronado is POD1 s/p primary  section for breech presentation and PROM/vaginal bleeding. Patient also developed pre-eclampsia without severe features as manifested by elevated TP:Cr ratio and mild range pressures on admission.   Postoperatively, patient has been hemodynamically stable, adequate urine output, and H/H appropriate for EBL. Blood pressures have been normotensive on delivery. No symptoms of severe features.   - Continue routine postop care   - Infant in NICU, plan to keep patient until POD4
Mrs. Coronado was seen and examined in the NICU. In summary, patient is  on POD4 s/p primary  for PPROM/breech. Patient is meeting all postop milestones and healing well. Of note, patient developed pre-eclampsia without severe features as noted by elevated urine TP:Cr ratio and mild range pressures on admission. Since delivery, blood pressures have been normotensive.   - Plan for discharge home today.   - Discharge teaching and precautions given  - Follow up in my office in 1 week for BP check and incision check

## 2022-09-01 NOTE — PROGRESS NOTE ADULT - ASSESSMENT
32y Female POD#4    s/p C/S, Uncomplicated                                       - Neuro/Pain:  toradol atc, tylenol atc, oxy prn  - CV:  VS per routine  - Pulm: Encourage ISS & Ambulation  - GI: Advance as tolerated  - : Voiding spontaneously  - DVT ppx: SCDs, Lovenox 40mg QD  - Dispo: POD #4 32y  POD#4  s/p primary C/S healing well.                                   - Neuro/Pain:  toradol atc, tylenol atc, oxy prn  - CV:  VS per routine  - Pulm: Encourage ISS & Ambulation  - GI: Advance as tolerated  - : Voiding spontaneously  - DVT ppx: SCDs, Lovenox 40mg QD  - Dispo: Discharge home today, POD #4

## 2022-09-01 NOTE — PROGRESS NOTE ADULT - SUBJECTIVE AND OBJECTIVE BOX
Patient evaluated at bedside.   She reports pain is well controlled with OPM.  She has been ambulating without assistance, voiding spontaneously, passing gas, tolerating regular diet and is breastfeeding.    She denies HA, dizziness, CP, palpitations, SOB, n/v, or heavy vaginal bleeding.    Physical Exam:  Vital Signs Last 24 Hrs  T(C): 37.1 (31 Aug 2022 22:00), Max: 37.1 (31 Aug 2022 22:00)  T(F): 98.7 (31 Aug 2022 22:00), Max: 98.7 (31 Aug 2022 22:00)  HR: 67 (31 Aug 2022 22:00) (67 - 80)  BP: 120/74 (31 Aug 2022 22:00) (113/71 - 135/83)  BP(mean): --  RR: 18 (31 Aug 2022 22:00) (17 - 18)  SpO2: 97% (31 Aug 2022 22:00) (95% - 97%)    Parameters below as of 31 Aug 2022 07:00  Patient On (Oxygen Delivery Method): room air        Gen: NAD  Abd: + BS, soft, nontender, nondistended, no rebound or guarding  Incision clean, dry and intact  uterus firm at midline  : lochia WNL  Extremities: no swelling or calf tenderness                 Patient evaluated at bedside.   She reports pain is well controlled with OPM.  She has been ambulating without assistance, voiding spontaneously, passing gas, tolerating regular diet and is breastfeeding.  Patient had bowel movement last night.     She denies HA, dizziness, CP, palpitations, SOB, n/v, or heavy vaginal bleeding.    Physical Exam:  Vital Signs Last 24 Hrs  T(C): 37.1 (31 Aug 2022 22:00), Max: 37.1 (31 Aug 2022 22:00)  T(F): 98.7 (31 Aug 2022 22:00), Max: 98.7 (31 Aug 2022 22:00)  HR: 67 (31 Aug 2022 22:00) (67 - 80)  BP: 120/74 (31 Aug 2022 22:00) (113/71 - 135/83)  BP(mean): --  RR: 18 (31 Aug 2022 22:00) (17 - 18)  SpO2: 97% (31 Aug 2022 22:00) (95% - 97%)    Parameters below as of 31 Aug 2022 07:00  Patient On (Oxygen Delivery Method): room air        Gen: NAD  Abd: + BS, soft, nontender, nondistended, no rebound or guarding  Incision clean, dry and intact  uterus firm at midline  : lochia WNL  Extremities: no swelling or calf tenderness

## 2022-09-07 ENCOUNTER — APPOINTMENT (OUTPATIENT)
Dept: ANTEPARTUM | Facility: CLINIC | Age: 32
End: 2022-09-07

## 2022-09-15 DIAGNOSIS — B00.9 HERPESVIRAL INFECTION, UNSPECIFIED: ICD-10-CM

## 2022-09-15 DIAGNOSIS — F32.A DEPRESSION, UNSPECIFIED: ICD-10-CM

## 2022-09-15 DIAGNOSIS — Z34.03 ENCOUNTER FOR SUPERVISION OF NORMAL FIRST PREGNANCY, THIRD TRIMESTER: ICD-10-CM

## 2022-09-15 DIAGNOSIS — Z28.09 IMMUNIZATION NOT CARRIED OUT BECAUSE OF OTHER CONTRAINDICATION: ICD-10-CM

## 2022-09-15 DIAGNOSIS — Z3A.35 35 WEEKS GESTATION OF PREGNANCY: ICD-10-CM

## 2022-11-27 ENCOUNTER — EMERGENCY (EMERGENCY)
Facility: HOSPITAL | Age: 32
LOS: 1 days | Discharge: HOME | End: 2022-11-27
Attending: EMERGENCY MEDICINE | Admitting: EMERGENCY MEDICINE
Payer: COMMERCIAL

## 2022-11-27 VITALS
HEART RATE: 70 BPM | OXYGEN SATURATION: 98 % | DIASTOLIC BLOOD PRESSURE: 67 MMHG | RESPIRATION RATE: 16 BRPM | SYSTOLIC BLOOD PRESSURE: 104 MMHG

## 2022-11-27 VITALS
OXYGEN SATURATION: 98 % | WEIGHT: 115.08 LBS | TEMPERATURE: 98 F | DIASTOLIC BLOOD PRESSURE: 74 MMHG | HEART RATE: 67 BPM | RESPIRATION RATE: 17 BRPM | SYSTOLIC BLOOD PRESSURE: 109 MMHG

## 2022-11-27 DIAGNOSIS — N64.4 MASTODYNIA: ICD-10-CM

## 2022-11-27 DIAGNOSIS — N61.1 ABSCESS OF THE BREAST AND NIPPLE: ICD-10-CM

## 2022-11-27 DIAGNOSIS — F41.8 OTHER SPECIFIED ANXIETY DISORDERS: ICD-10-CM

## 2022-11-27 LAB
ALBUMIN SERPL ELPH-MCNC: 4.2 G/DL — SIGNIFICANT CHANGE UP (ref 3.3–5)
ALP SERPL-CCNC: 81 U/L — SIGNIFICANT CHANGE UP (ref 40–120)
ALT FLD-CCNC: 9 U/L — LOW (ref 10–45)
ANION GAP SERPL CALC-SCNC: 10 MMOL/L — SIGNIFICANT CHANGE UP (ref 5–17)
APPEARANCE UR: CLEAR — SIGNIFICANT CHANGE UP
APTT BLD: 31.7 SEC — SIGNIFICANT CHANGE UP (ref 27.5–35.5)
AST SERPL-CCNC: 11 U/L — SIGNIFICANT CHANGE UP (ref 10–40)
BACTERIA # UR AUTO: PRESENT /HPF
BASOPHILS # BLD AUTO: 0.03 K/UL — SIGNIFICANT CHANGE UP (ref 0–0.2)
BASOPHILS NFR BLD AUTO: 0.3 % — SIGNIFICANT CHANGE UP (ref 0–2)
BILIRUB SERPL-MCNC: <0.2 MG/DL — SIGNIFICANT CHANGE UP (ref 0.2–1.2)
BILIRUB UR-MCNC: NEGATIVE — SIGNIFICANT CHANGE UP
BLD GP AB SCN SERPL QL: NEGATIVE — SIGNIFICANT CHANGE UP
BUN SERPL-MCNC: 17 MG/DL — SIGNIFICANT CHANGE UP (ref 7–23)
CALCIUM SERPL-MCNC: 9.8 MG/DL — SIGNIFICANT CHANGE UP (ref 8.4–10.5)
CHLORIDE SERPL-SCNC: 106 MMOL/L — SIGNIFICANT CHANGE UP (ref 96–108)
CO2 SERPL-SCNC: 27 MMOL/L — SIGNIFICANT CHANGE UP (ref 22–31)
COLOR SPEC: YELLOW — SIGNIFICANT CHANGE UP
COMMENT - URINE: SIGNIFICANT CHANGE UP
CREAT SERPL-MCNC: 0.8 MG/DL — SIGNIFICANT CHANGE UP (ref 0.5–1.3)
DIFF PNL FLD: ABNORMAL
EGFR: 101 ML/MIN/1.73M2 — SIGNIFICANT CHANGE UP
EOSINOPHIL # BLD AUTO: 0.17 K/UL — SIGNIFICANT CHANGE UP (ref 0–0.5)
EOSINOPHIL NFR BLD AUTO: 2 % — SIGNIFICANT CHANGE UP (ref 0–6)
EPI CELLS # UR: ABNORMAL /HPF (ref 0–5)
GLUCOSE SERPL-MCNC: 97 MG/DL — SIGNIFICANT CHANGE UP (ref 70–99)
GLUCOSE UR QL: NEGATIVE — SIGNIFICANT CHANGE UP
GRAN CASTS # UR COMP ASSIST: ABNORMAL /LPF
HCT VFR BLD CALC: 36.7 % — SIGNIFICANT CHANGE UP (ref 34.5–45)
HGB BLD-MCNC: 12.2 G/DL — SIGNIFICANT CHANGE UP (ref 11.5–15.5)
HYALINE CASTS # UR AUTO: SIGNIFICANT CHANGE UP /LPF (ref 0–2)
IMM GRANULOCYTES NFR BLD AUTO: 0.1 % — SIGNIFICANT CHANGE UP (ref 0–0.9)
INR BLD: 1.09 — SIGNIFICANT CHANGE UP (ref 0.88–1.16)
KETONES UR-MCNC: NEGATIVE — SIGNIFICANT CHANGE UP
LEUKOCYTE ESTERASE UR-ACNC: NEGATIVE — SIGNIFICANT CHANGE UP
LYMPHOCYTES # BLD AUTO: 1.66 K/UL — SIGNIFICANT CHANGE UP (ref 1–3.3)
LYMPHOCYTES # BLD AUTO: 19.3 % — SIGNIFICANT CHANGE UP (ref 13–44)
MCHC RBC-ENTMCNC: 27.4 PG — SIGNIFICANT CHANGE UP (ref 27–34)
MCHC RBC-ENTMCNC: 33.2 GM/DL — SIGNIFICANT CHANGE UP (ref 32–36)
MCV RBC AUTO: 82.3 FL — SIGNIFICANT CHANGE UP (ref 80–100)
MONOCYTES # BLD AUTO: 0.66 K/UL — SIGNIFICANT CHANGE UP (ref 0–0.9)
MONOCYTES NFR BLD AUTO: 7.7 % — SIGNIFICANT CHANGE UP (ref 2–14)
NEUTROPHILS # BLD AUTO: 6.05 K/UL — SIGNIFICANT CHANGE UP (ref 1.8–7.4)
NEUTROPHILS NFR BLD AUTO: 70.6 % — SIGNIFICANT CHANGE UP (ref 43–77)
NITRITE UR-MCNC: NEGATIVE — SIGNIFICANT CHANGE UP
NRBC # BLD: 0 /100 WBCS — SIGNIFICANT CHANGE UP (ref 0–0)
PH UR: 5.5 — SIGNIFICANT CHANGE UP (ref 5–8)
PLATELET # BLD AUTO: 296 K/UL — SIGNIFICANT CHANGE UP (ref 150–400)
POTASSIUM SERPL-MCNC: 4.7 MMOL/L — SIGNIFICANT CHANGE UP (ref 3.5–5.3)
POTASSIUM SERPL-SCNC: 4.7 MMOL/L — SIGNIFICANT CHANGE UP (ref 3.5–5.3)
PROT SERPL-MCNC: 7 G/DL — SIGNIFICANT CHANGE UP (ref 6–8.3)
PROT UR-MCNC: NEGATIVE MG/DL — SIGNIFICANT CHANGE UP
PROTHROM AB SERPL-ACNC: 13 SEC — SIGNIFICANT CHANGE UP (ref 10.5–13.4)
RBC # BLD: 4.46 M/UL — SIGNIFICANT CHANGE UP (ref 3.8–5.2)
RBC # FLD: 13.2 % — SIGNIFICANT CHANGE UP (ref 10.3–14.5)
RBC CASTS # UR COMP ASSIST: < 5 /HPF — SIGNIFICANT CHANGE UP
RH IG SCN BLD-IMP: POSITIVE — SIGNIFICANT CHANGE UP
SODIUM SERPL-SCNC: 143 MMOL/L — SIGNIFICANT CHANGE UP (ref 135–145)
SP GR SPEC: >=1.03 — SIGNIFICANT CHANGE UP (ref 1–1.03)
UROBILINOGEN FLD QL: 0.2 E.U./DL — SIGNIFICANT CHANGE UP
WBC # BLD: 8.58 K/UL — SIGNIFICANT CHANGE UP (ref 3.8–10.5)
WBC # FLD AUTO: 8.58 K/UL — SIGNIFICANT CHANGE UP (ref 3.8–10.5)
WBC UR QL: ABNORMAL /HPF

## 2022-11-27 PROCEDURE — 87040 BLOOD CULTURE FOR BACTERIA: CPT

## 2022-11-27 PROCEDURE — 81001 URINALYSIS AUTO W/SCOPE: CPT

## 2022-11-27 PROCEDURE — 96375 TX/PRO/DX INJ NEW DRUG ADDON: CPT | Mod: XU

## 2022-11-27 PROCEDURE — 10160 PNXR ASPIR ABSC HMTMA BULLA: CPT

## 2022-11-27 PROCEDURE — 76642 ULTRASOUND BREAST LIMITED: CPT | Mod: 26,RT

## 2022-11-27 PROCEDURE — 86900 BLOOD TYPING SEROLOGIC ABO: CPT

## 2022-11-27 PROCEDURE — 99284 EMERGENCY DEPT VISIT MOD MDM: CPT | Mod: 25

## 2022-11-27 PROCEDURE — 96376 TX/PRO/DX INJ SAME DRUG ADON: CPT | Mod: XU

## 2022-11-27 PROCEDURE — 36415 COLL VENOUS BLD VENIPUNCTURE: CPT

## 2022-11-27 PROCEDURE — 86901 BLOOD TYPING SEROLOGIC RH(D): CPT

## 2022-11-27 PROCEDURE — 87070 CULTURE OTHR SPECIMN AEROBIC: CPT

## 2022-11-27 PROCEDURE — 96374 THER/PROPH/DIAG INJ IV PUSH: CPT | Mod: XU

## 2022-11-27 PROCEDURE — 87186 SC STD MICRODIL/AGAR DIL: CPT

## 2022-11-27 PROCEDURE — 85730 THROMBOPLASTIN TIME PARTIAL: CPT

## 2022-11-27 PROCEDURE — 99285 EMERGENCY DEPT VISIT HI MDM: CPT

## 2022-11-27 PROCEDURE — 85610 PROTHROMBIN TIME: CPT

## 2022-11-27 PROCEDURE — 80053 COMPREHEN METABOLIC PANEL: CPT

## 2022-11-27 PROCEDURE — 85025 COMPLETE CBC W/AUTO DIFF WBC: CPT

## 2022-11-27 PROCEDURE — 76642 ULTRASOUND BREAST LIMITED: CPT

## 2022-11-27 PROCEDURE — 86850 RBC ANTIBODY SCREEN: CPT

## 2022-11-27 RX ORDER — OXYCODONE AND ACETAMINOPHEN 5; 325 MG/1; MG/1
1 TABLET ORAL
Qty: 9 | Refills: 0
Start: 2022-11-27 | End: 2022-11-29

## 2022-11-27 RX ORDER — OXYCODONE AND ACETAMINOPHEN 5; 325 MG/1; MG/1
1 TABLET ORAL ONCE
Refills: 0 | Status: DISCONTINUED | OUTPATIENT
Start: 2022-11-27 | End: 2022-11-27

## 2022-11-27 RX ORDER — AMPICILLIN SODIUM AND SULBACTAM SODIUM 250; 125 MG/ML; MG/ML
3 INJECTION, POWDER, FOR SUSPENSION INTRAMUSCULAR; INTRAVENOUS ONCE
Refills: 0 | Status: COMPLETED | OUTPATIENT
Start: 2022-11-27 | End: 2022-11-27

## 2022-11-27 RX ORDER — MORPHINE SULFATE 50 MG/1
4 CAPSULE, EXTENDED RELEASE ORAL ONCE
Refills: 0 | Status: DISCONTINUED | OUTPATIENT
Start: 2022-11-27 | End: 2022-11-27

## 2022-11-27 RX ORDER — SODIUM CHLORIDE 9 MG/ML
1000 INJECTION INTRAMUSCULAR; INTRAVENOUS; SUBCUTANEOUS ONCE
Refills: 0 | Status: COMPLETED | OUTPATIENT
Start: 2022-11-27 | End: 2022-11-27

## 2022-11-27 RX ADMIN — MORPHINE SULFATE 4 MILLIGRAM(S): 50 CAPSULE, EXTENDED RELEASE ORAL at 15:21

## 2022-11-27 RX ADMIN — AMPICILLIN SODIUM AND SULBACTAM SODIUM 200 GRAM(S): 250; 125 INJECTION, POWDER, FOR SUSPENSION INTRAMUSCULAR; INTRAVENOUS at 14:33

## 2022-11-27 RX ADMIN — OXYCODONE AND ACETAMINOPHEN 1 TABLET(S): 5; 325 TABLET ORAL at 16:27

## 2022-11-27 RX ADMIN — MORPHINE SULFATE 4 MILLIGRAM(S): 50 CAPSULE, EXTENDED RELEASE ORAL at 13:02

## 2022-11-27 RX ADMIN — SODIUM CHLORIDE 1000 MILLILITER(S): 9 INJECTION INTRAMUSCULAR; INTRAVENOUS; SUBCUTANEOUS at 13:04

## 2022-11-27 NOTE — CHART NOTE - NSCHARTNOTEFT_GEN_A_CORE
Senior Resident Note    32F no PMH, post partum 3 months presenting with right sided breast pain. Per patient she was exclusively pumping, which she completed one month ago, but experienced severe engorgement with inadequate emptying frequently. She began having breast pain for at least a month, but it acutely worsened this week. She saw her GYN, who recommended NSAIDs/Tylenol, however patient symptoms did not improve, and she began to notice a palpable bulge on her breast. She presented to ED today for further evaluation. On exam, afebrile, otherwise HD wnl. Right breast at 6 o'clock position with severe tenderness, erythema and fluctuance. Labs grossly normal and bedside ultrasound showed [id number] collection concerning for abscess. Bedside drainage performed with aspiration of at least 30cc purulence, sent for culture. Case discussed with Dr. Matute and patient will be discharged from ED on Bactrim with follow up with Dr. Matute this week.

## 2022-11-27 NOTE — ED PROVIDER NOTE - NSFOLLOWUPINSTRUCTIONS_ED_ALL_ED_FT
TAKE ANTIBIOTICS, APPLY WARM COMPRESSES, PAIN MEDICATION AS NEEDED, FOLLOW UP WITH BREAST SURGEON  Abscess Incision and Drainage  WHAT YOU NEED TO KNOW:  An abscess incision and drainage (I and D) is a procedure to drain pus from an abscess and clean it out so it can heal.  DISCHARGE INSTRUCTIONS:  Contact your healthcare provider if:   •The area around your abscess has red streaks or is warm and painful.   •You have a fever or chills.   •You have increased redness, swelling, or pain in your wound.  •Your wound does not start to heal after a few days.  •Your abscess returns.  •You have questions or concerns about your condition or care.  Medicines:   •NSAIDs, such as ibuprofen, help decrease swelling, pain, and fever. NSAIDs can cause stomach bleeding or kidney problems in certain people. If you take blood thinner medicine, always ask your healthcare provider if NSAIDs are safe for you. Always read the medicine label and follow directions.  •Take your medicine as directed. Contact your healthcare provider if you think your medicine is not helping or if you have side effects. Tell your provider if you are allergic to any medicine. Keep a list of the medicines, vitamins, and herbs you take. Include the amounts, and when and why you take them. Bring the list or the pill bottles to follow-up visits. Carry your medicine list with you in case of an emergency.  Care for your wound as directed:   •Do not remove your bandage unless your healthcare provider says it is okay. Keep the bandage clean and dry. Remove your bandage and clean the wound once your healthcare provider gives you directions.   •Apply heat on the bandage over your wound for 20 to 30 minutes every 2 hours for as many days as directed. This will increase blood flow to the area and help it heal.  •Elevate your wound above level of your heart as often as you can. This will help decrease swelling and pain. Prop your wounded area on pillows or blankets to keep it elevated comfortably  Follow up with your healthcare provider as directed: You may need to return in 1 to 3 days to have the gauze in your wound removed and your wound examined. You may be taught how to change the gauze in your wound. Write down your questions so you remember to ask them during your visits

## 2022-11-27 NOTE — ED PROVIDER NOTE - CLINICAL SUMMARY MEDICAL DECISION MAKING FREE TEXT BOX
pt w/r breast abscess and surrounding cellulitis - has been pumping over past 3 mon, no systemic symptoms, labs wnl, pain controlled, us confirmed abscess--surg consulted and did I&D, pt stable for dc w/po abx and pain meds, has f/u in place, pt understands and agrees w/plan, strict return precautions given

## 2022-11-27 NOTE — CONSULT NOTE ADULT - SUBJECTIVE AND OBJECTIVE BOX
HPI:  32F, , with pmh of anxiety/depression and 3 months s/p  and no other psh who presents from home with 2 week h/o of progressive right breast nodule with increasing erythema, pain, calor over the past week. She reports breast pumping for about 2 months then stopped 4 weeks ago. Noted a nodule in the OLQ of her right breast and was evaluated by her OB/GYN who did not recommend further treatment at the time. Nodule continued to progress in size and sought care with a radiologist with an appt for . D/t increasing pain opted to come to the ED for further evaluation. Denies any recent trauma or bites to her breast. Has never had a breast abscess before. Has never had a mammogram. Denies f/c, n/v, CP, SOB, cough, nipple drainage, weakness or pain extremities.     In the ED vitals were all wnl. Labs also wnl including WBC 8.58 and Cr 0.80. Breast US notable for 5.3 cm complex fluid collection peripheral vascularity in the right breast with overlying skin thickening, suggestive of cellulitis and   underlying abscess. Received 3g of Unasyn.    PMH: Anxiety/depression  PSH:  section  Allergies: NKDA  Medications: Zoloft  SH: Occasional 2-3 glass of wine/week, no h/o tobacco use  FH: No h/o Breast CA        Vital Signs Last 24 Hrs  T(C): 36.5 (2022 11:26), Max: 36.5 (2022 11:26)  T(F): 97.7 (2022 11:26), Max: 97.7 (2022 11:26)  HR: 55 (2022 14:41) (55 - 67)  BP: 107/65 (2022 14:41) (107/65 - 109/74)  BP(mean): --  RR: 16 (2022 14:41) (16 - 17)  SpO2: 98% (2022 14:41) (98% - 98%)      I&O's Detail      General: NAD, resting comfortably in bed  C/V: NSR  Pulm: Nonlabored breathing, no respiratory distress  Chest: Right breast with 4x6cm area of erythema and induration in LOQ with fluctuance and TTP. No drainage or inversion of nipple. No axillary LAD.  Extrem: WWP, no edema      LABS:                        12.2   8.58  )-----------( 296      ( 2022 13:28 )             36.7         143  |  106  |  17  ----------------------------<  97  4.7   |  27  |  0.80    Ca    9.8      2022 13:28    TPro  7.0  /  Alb  4.2  /  TBili  <0.2  /  DBili  x   /  AST  11  /  ALT  9<L>  /  AlkPhos  81      PT/INR - ( 2022 13:28 )   PT: 13.0 sec;   INR: 1.09          PTT - ( 2022 13:28 )  PTT:31.7 sec  Urinalysis Basic - ( 2022 13:28 )    Color: Yellow / Appearance: Clear / SG: >=1.030 / pH: x  Gluc: x / Ketone: NEGATIVE  / Bili: Negative / Urobili: 0.2 E.U./dL   Blood: x / Protein: NEGATIVE mg/dL / Nitrite: NEGATIVE   Leuk Esterase: NEGATIVE / RBC: < 5 /HPF / WBC 5-10 /HPF   Sq Epi: x / Non Sq Epi: 5-10 /HPF / Bacteria: Present /HPF        RADIOLOGY & ADDITIONAL STUDIES:  < from: US Breast Limited, Right (22 @ 13:51) >    ******PRELIMINARY REPORT******      ******PRELIMINARY REPORT******       ACC: [id number] EXAM:  US BREAST LIMITED RT                          PROCEDURE DATE:  2022    ******PRELIMINARY REPORT******      ******PRELIMINARY REPORT******           INTERPRETATION:  CLINICAL INDICATION:  Cellulitis, palpable warm lump   growing in size.  Rule out abscess.    TECHNIQUE:  Limited sonographic evaluation of the right breast was   performed, targeted to the area of pain in the 5:00 to 8:00 axis, 3 cm   from the nipple. ?Evaluation was performed by the technologist and   submitted for interpretation..  This study was performed exclusively for   the purpose of excluding the presence of abscess in the clinical setting   of mastitis.?  ?  FINDINGS: Inthe palpable area of concern of the right breast, at the   5:00-8:00 axis 3 cm from the nipple, there is a 3.5 x 4.3 x 5.3 cm   complex fluid collection with some peripheral vascularity, suggestive of   abscess. There is overlying skin thickening andhypervascularity,   suggestive of cellulitis.  ?  IMPRESSION:  5.3 cm complex fluid collection peripheral vascularity in the right   breast with overlying skin thickening, suggestive of cellulitis and   underlying abscess.    If symptoms do not resolve clinically with appropriate treatment,   additional imaging in a dedicated breast imaging center should be   performed to exclude the possibility of malignancy.        ******PRELIMINARY REPORT******      ******PRELIMINARY REPORT******       AARON PALMA; Resident Radiologist  This document is a PRELIMINARY interpretation and is pending final   attending approval. 2022  2:31PM    < end of copied text >

## 2022-11-27 NOTE — CONSULT NOTE ADULT - ASSESSMENT
32F, , with pmh of anxiety/depression and 3 months s/p  and no other psh who presents from home with 2 week h/o of progressive right breast nodule with right breast abscess.     Consented for US guided aspiration of right breast abscess  Aspiration of 50cc of green-purulent drainage  Aspirant culture sent  Recommend 10days of DS bactrim daily  Analgesics with tylenol 650mg q6h and ibuprofen 600mg q6h   Warm compresses to breast PRN  Please follow up with radiology this week for imaging of right breast  Please call Dr. Matute's office at  to schedule a f/u this week  Ok to discharge home from surgical perspective  Pt discussed with surgery attending and ED provider  Evaluated with chief resident

## 2022-11-27 NOTE — ED PROVIDER NOTE - PATIENT PORTAL LINK FT
You can access the FollowMyHealth Patient Portal offered by [facility] by registering at the following website: [email or web address]. By joining [facility]’s FollowMyHealth portal, you will also be able to view your health information using other applications (apps) compatible with our system.

## 2022-11-27 NOTE — ED PROVIDER NOTE - NS ED ATTENDING STATEMENT MOD
This was a shared visit with the [facility]. I reviewed and verified the documentation and independently performed the documented:

## 2022-11-27 NOTE — ED ADULT NURSE REASSESSMENT NOTE - NS ED NURSE REASSESS COMMENT FT1
patient back to ED from US, antibiotic is running, pending surgery evaluation. stable at chair, A&Ox4,

## 2022-11-27 NOTE — ED PROVIDER NOTE - OBJECTIVE STATEMENT
The pt is a 33 y/o F, who presents to ED c/o R breast pain x 2 mon. Pt is 3 mon post partum, has been pumping, states that noticed swelling and redness to R breast but told by her gyn to just observe, pt c/o increased pain, increased swelling, and now "ball" to breast, has been taking tyl and motin w/o relief. Denies fevers, chills, pus, bleeding, any other c/o.

## 2022-11-27 NOTE — ED PROVIDER NOTE - GENITOURINARY, MLM
R breast w/a 4 cm area of erythema, + central 3 cm area of swelling, tend and fluctuance at 6 o'clock, + R axillary lymphadenopathy, no nipple dc or retractions

## 2022-11-27 NOTE — ED ADULT NURSE NOTE - OBJECTIVE STATEMENT
pt presents to ED with Rt. under breast pain, redness, swelling for 2wks, ended breast feeding 3wks ago, pt denies chest pain, sob, itchy sense, dizziness, abdominal pain, nausea, vomiting, fever. A&Ox4, stable at chair. denies medical hx, medication allergies.

## 2022-11-27 NOTE — ED PROVIDER NOTE - CARE PROVIDER_API CALL
David Matute)  Surgery  [address] [address]  Phone: [phone]  Fax: [phone]  Follow Up Time:

## 2022-11-27 NOTE — PROCEDURE NOTE - NSUS ED ADDITIONAL DETAIL1 FT
Patient was prepped and draped in usual sterile fashion.   1% lidocaine used to anesthetize the right breast abscess  Complex fluid collection appreciated and 16G needle used to aspirate ~50cc of green pus- sent for culture  Repeat US noted collapse of loculations  Gentle pressure applied to breast and dressed with 4x4 and paper tape  Patient tolerated procedure well.

## 2022-11-28 ENCOUNTER — FORM ENCOUNTER (OUTPATIENT)
Age: 32
End: 2022-11-28

## 2022-11-28 LAB
GRAM STN FLD: SIGNIFICANT CHANGE UP
SPECIMEN SOURCE: SIGNIFICANT CHANGE UP

## 2022-11-29 LAB
-  CLINDAMYCIN: SIGNIFICANT CHANGE UP
-  ERYTHROMYCIN: SIGNIFICANT CHANGE UP
-  LINEZOLID: SIGNIFICANT CHANGE UP
-  OXACILLIN: SIGNIFICANT CHANGE UP
-  RIFAMPIN: SIGNIFICANT CHANGE UP
-  TRIMETHOPRIM/SULFAMETHOXAZOLE: SIGNIFICANT CHANGE UP
-  VANCOMYCIN: SIGNIFICANT CHANGE UP
CULTURE RESULTS: SIGNIFICANT CHANGE UP
METHOD TYPE: SIGNIFICANT CHANGE UP
ORGANISM # SPEC MICROSCOPIC CNT: SIGNIFICANT CHANGE UP
ORGANISM # SPEC MICROSCOPIC CNT: SIGNIFICANT CHANGE UP
SPECIMEN SOURCE: SIGNIFICANT CHANGE UP

## 2022-12-02 LAB
CULTURE RESULTS: SIGNIFICANT CHANGE UP
CULTURE RESULTS: SIGNIFICANT CHANGE UP
SPECIMEN SOURCE: SIGNIFICANT CHANGE UP
SPECIMEN SOURCE: SIGNIFICANT CHANGE UP

## 2024-03-05 ENCOUNTER — EMERGENCY (EMERGENCY)
Facility: HOSPITAL | Age: 34
LOS: 1 days | Discharge: HOME | End: 2024-03-05
Attending: STUDENT IN AN ORGANIZED HEALTH CARE EDUCATION/TRAINING PROGRAM | Admitting: STUDENT IN AN ORGANIZED HEALTH CARE EDUCATION/TRAINING PROGRAM
Payer: COMMERCIAL

## 2024-03-05 VITALS
HEART RATE: 107 BPM | TEMPERATURE: 98 F | DIASTOLIC BLOOD PRESSURE: 58 MMHG | HEIGHT: 60 IN | RESPIRATION RATE: 20 BRPM | OXYGEN SATURATION: 97 % | SYSTOLIC BLOOD PRESSURE: 94 MMHG | WEIGHT: 104.94 LBS

## 2024-03-05 VITALS
HEART RATE: 86 BPM | RESPIRATION RATE: 20 BRPM | TEMPERATURE: 99 F | OXYGEN SATURATION: 97 % | DIASTOLIC BLOOD PRESSURE: 67 MMHG | SYSTOLIC BLOOD PRESSURE: 104 MMHG

## 2024-03-05 PROBLEM — F41.9 ANXIETY DISORDER, UNSPECIFIED: Chronic | Status: ACTIVE | Noted: 2022-11-27

## 2024-03-05 LAB
ALBUMIN SERPL ELPH-MCNC: 4.7 G/DL — SIGNIFICANT CHANGE UP (ref 3.3–5)
ALP SERPL-CCNC: 51 U/L — SIGNIFICANT CHANGE UP (ref 40–120)
ALT FLD-CCNC: 10 U/L — SIGNIFICANT CHANGE UP (ref 10–45)
ANION GAP SERPL CALC-SCNC: 12 MMOL/L — SIGNIFICANT CHANGE UP (ref 5–17)
AST SERPL-CCNC: 12 U/L — SIGNIFICANT CHANGE UP (ref 10–40)
BILIRUB SERPL-MCNC: 0.5 MG/DL — SIGNIFICANT CHANGE UP (ref 0.2–1.2)
BUN SERPL-MCNC: 15 MG/DL — SIGNIFICANT CHANGE UP (ref 7–23)
CALCIUM SERPL-MCNC: 9.6 MG/DL — SIGNIFICANT CHANGE UP (ref 8.4–10.5)
CHLORIDE SERPL-SCNC: 102 MMOL/L — SIGNIFICANT CHANGE UP (ref 96–108)
CO2 SERPL-SCNC: 23 MMOL/L — SIGNIFICANT CHANGE UP (ref 22–31)
CREAT SERPL-MCNC: 0.74 MG/DL — SIGNIFICANT CHANGE UP (ref 0.5–1.3)
EGFR: 110 ML/MIN/1.73M2 — SIGNIFICANT CHANGE UP
FLUAV AG NPH QL: SIGNIFICANT CHANGE UP
FLUBV AG NPH QL: SIGNIFICANT CHANGE UP
GLUCOSE SERPL-MCNC: 106 MG/DL — HIGH (ref 70–99)
HCG SERPL-ACNC: <0 MIU/ML — SIGNIFICANT CHANGE UP
HCT VFR BLD CALC: 41.8 % — SIGNIFICANT CHANGE UP (ref 34.5–45)
HGB BLD-MCNC: 14.7 G/DL — SIGNIFICANT CHANGE UP (ref 11.5–15.5)
MAGNESIUM SERPL-MCNC: 1.8 MG/DL — SIGNIFICANT CHANGE UP (ref 1.6–2.6)
MCHC RBC-ENTMCNC: 29.7 PG — SIGNIFICANT CHANGE UP (ref 27–34)
MCHC RBC-ENTMCNC: 35.2 GM/DL — SIGNIFICANT CHANGE UP (ref 32–36)
MCV RBC AUTO: 84.4 FL — SIGNIFICANT CHANGE UP (ref 80–100)
NRBC # BLD: 0 /100 WBCS — SIGNIFICANT CHANGE UP (ref 0–0)
PLATELET # BLD AUTO: 179 K/UL — SIGNIFICANT CHANGE UP (ref 150–400)
POTASSIUM SERPL-MCNC: 4.3 MMOL/L — SIGNIFICANT CHANGE UP (ref 3.5–5.3)
POTASSIUM SERPL-SCNC: 4.3 MMOL/L — SIGNIFICANT CHANGE UP (ref 3.5–5.3)
PROT SERPL-MCNC: 6.7 G/DL — SIGNIFICANT CHANGE UP (ref 6–8.3)
RBC # BLD: 4.95 M/UL — SIGNIFICANT CHANGE UP (ref 3.8–5.2)
RBC # FLD: 12.1 % — SIGNIFICANT CHANGE UP (ref 10.3–14.5)
RSV RNA NPH QL NAA+NON-PROBE: SIGNIFICANT CHANGE UP
SARS-COV-2 RNA SPEC QL NAA+PROBE: SIGNIFICANT CHANGE UP
SODIUM SERPL-SCNC: 137 MMOL/L — SIGNIFICANT CHANGE UP (ref 135–145)
TROPONIN T, HIGH SENSITIVITY RESULT: <6 NG/L — SIGNIFICANT CHANGE UP (ref 0–51)
WBC # BLD: 6.57 K/UL — SIGNIFICANT CHANGE UP (ref 3.8–10.5)
WBC # FLD AUTO: 6.57 K/UL — SIGNIFICANT CHANGE UP (ref 3.8–10.5)

## 2024-03-05 PROCEDURE — 36415 COLL VENOUS BLD VENIPUNCTURE: CPT

## 2024-03-05 PROCEDURE — 85027 COMPLETE CBC AUTOMATED: CPT

## 2024-03-05 PROCEDURE — 84484 ASSAY OF TROPONIN QUANT: CPT

## 2024-03-05 PROCEDURE — 99285 EMERGENCY DEPT VISIT HI MDM: CPT

## 2024-03-05 PROCEDURE — 87637 SARSCOV2&INF A&B&RSV AMP PRB: CPT

## 2024-03-05 PROCEDURE — 93005 ELECTROCARDIOGRAM TRACING: CPT

## 2024-03-05 PROCEDURE — 99285 EMERGENCY DEPT VISIT HI MDM: CPT | Mod: 25

## 2024-03-05 PROCEDURE — 96374 THER/PROPH/DIAG INJ IV PUSH: CPT

## 2024-03-05 PROCEDURE — 83735 ASSAY OF MAGNESIUM: CPT

## 2024-03-05 PROCEDURE — 80053 COMPREHEN METABOLIC PANEL: CPT

## 2024-03-05 PROCEDURE — 71045 X-RAY EXAM CHEST 1 VIEW: CPT

## 2024-03-05 PROCEDURE — 84702 CHORIONIC GONADOTROPIN TEST: CPT

## 2024-03-05 PROCEDURE — 71045 X-RAY EXAM CHEST 1 VIEW: CPT | Mod: 26

## 2024-03-05 RX ORDER — KETOROLAC TROMETHAMINE 30 MG/ML
30 SYRINGE (ML) INJECTION ONCE
Refills: 0 | Status: DISCONTINUED | OUTPATIENT
Start: 2024-03-05 | End: 2024-03-05

## 2024-03-05 RX ORDER — SODIUM CHLORIDE 9 MG/ML
1000 INJECTION INTRAMUSCULAR; INTRAVENOUS; SUBCUTANEOUS ONCE
Refills: 0 | Status: COMPLETED | OUTPATIENT
Start: 2024-03-05 | End: 2024-03-05

## 2024-03-05 RX ORDER — ACETAMINOPHEN 500 MG
650 TABLET ORAL ONCE
Refills: 0 | Status: COMPLETED | OUTPATIENT
Start: 2024-03-05 | End: 2024-03-05

## 2024-03-05 RX ADMIN — SODIUM CHLORIDE 1000 MILLILITER(S): 9 INJECTION INTRAMUSCULAR; INTRAVENOUS; SUBCUTANEOUS at 15:37

## 2024-03-05 RX ADMIN — Medication 650 MILLIGRAM(S): at 17:05

## 2024-03-05 RX ADMIN — Medication 30 MILLIGRAM(S): at 17:06

## 2024-03-05 NOTE — ED ADULT NURSE NOTE - OBJECTIVE STATEMENT
34 yo F PMHx anxiety presents to the ED co fatigue, body aches x1 day. pt awake and alert, AOx4. Pt reports she found out her mom has breast ca last night. pt reports she woke up at 4AM having a panic attack (pt denies hx of panic attacks). Pt reports she had chest tightness and felt out of breath. Pt reports she now feels fatigued and body aches. pt states, "I feel like I have the flu without the cough or the fever." Pt reports lightheadedness and dizziness when she stands up. pt reports nausea and decreased appetite today. pt denies vomiting. Pt endorses ha. pt denies current CP. Pt denies V/D, f/c, numbness, tingling, vision changes, urinary sx.

## 2024-03-05 NOTE — ED ADULT NURSE REASSESSMENT NOTE - NS ED NURSE REASSESS COMMENT FT1
RN provided and reviewed DC papers with pt and pt verbalized understanding. Pt reported no further questions. IV removed. pt ambulated out of ED with steady gait.

## 2024-03-05 NOTE — ED PROVIDER NOTE - PATIENT PORTAL LINK FT
You can access the FollowMyHealth Patient Portal offered by [facility] by registering at the following website: [email or web address]. By joining [facility]’s FollowMyHealth portal, you will also be able to view your health information using other applications (apps) compatible with our system.

## 2024-03-05 NOTE — ED ADULT TRIAGE NOTE - CHIEF COMPLAINT QUOTE
Pt presents to ED c/o weakness, fainting, body aches nausea started today since 0400AM. Denies CP, SOB, fever or chills. No known PMHx. Pt A&Ox4, conversive in full sentences and ambulatory.

## 2024-03-05 NOTE — ED PROVIDER NOTE - CLINICAL SUMMARY MEDICAL DECISION MAKING FREE TEXT BOX
no clinical signs of infection, laboratory rosa negative. no clnical signs of DVT. PE unlikely. symptoms possible in setting of minimal sleep and minimal po intake in the setting of receiving distressing news. improve with ed treatment. stable for discharge, no further ed workup necessary/indicated. ekg NSR, no ischemic change

## 2024-03-05 NOTE — ED ADULT NURSE NOTE - NSFALLUNIVINTERV_ED_ALL_ED
Bed/Stretcher in lowest position, wheels locked, appropriate side rails in place/Call bell, personal items and telephone in reach/Instruct patient to call for assistance before getting out of bed/chair/stretcher/Non-slip footwear applied when patient is off stretcher/[place] to call system/Physically safe environment - no spills, clutter or unnecessary equipment/Purposeful proactive rounding/Room/bathroom lighting operational, light cord in reach

## 2024-03-05 NOTE — ED PROVIDER NOTE - OBJECTIVE STATEMENT
33F denies relevant pmhx. last night developed chest discomfort which eventually resolved. was sleeping when symptoms began. today feels very weak, "run down". feels fainy. minimal po intake today. no black/bloody stool, no recent heavy bleeding. shortly before going to sleep last night patient received very distressing news about the health of a close family member and did spend a significant portion of the night awake and crying.

## 2024-03-08 DIAGNOSIS — R53.1 WEAKNESS: ICD-10-CM

## 2024-03-08 DIAGNOSIS — R63.0 ANOREXIA: ICD-10-CM

## 2024-03-08 DIAGNOSIS — Z20.822 CONTACT WITH AND (SUSPECTED) EXPOSURE TO COVID-19: ICD-10-CM
